# Patient Record
Sex: MALE | Race: WHITE | URBAN - METROPOLITAN AREA
[De-identification: names, ages, dates, MRNs, and addresses within clinical notes are randomized per-mention and may not be internally consistent; named-entity substitution may affect disease eponyms.]

---

## 2020-09-25 ENCOUNTER — OFFICE VISIT (OUTPATIENT)
Dept: URBAN - METROPOLITAN AREA CLINIC 17 | Facility: CLINIC | Age: 85
End: 2020-09-25
Payer: MEDICARE

## 2020-09-25 DIAGNOSIS — H26.491 OTHER SECONDARY CATARACT, RIGHT EYE: Primary | ICD-10-CM

## 2020-09-25 PROCEDURE — 99203 OFFICE O/P NEW LOW 30 MIN: CPT | Performed by: OPHTHALMOLOGY

## 2020-09-25 ASSESSMENT — VISUAL ACUITY
OD: 20/50
OS: 20/40

## 2020-09-25 ASSESSMENT — INTRAOCULAR PRESSURE
OS: 15
OD: 17

## 2020-09-25 ASSESSMENT — KERATOMETRY
OD: 44.38
OS: 44.25

## 2020-10-12 ENCOUNTER — SURGERY (OUTPATIENT)
Dept: URBAN - METROPOLITAN AREA SURGERY 7 | Facility: SURGERY | Age: 85
End: 2020-10-12
Payer: MEDICARE

## 2020-10-12 PROCEDURE — 66821 AFTER CATARACT LASER SURGERY: CPT | Performed by: OPHTHALMOLOGY

## 2020-10-19 ENCOUNTER — POST-OPERATIVE VISIT (OUTPATIENT)
Dept: URBAN - METROPOLITAN AREA CLINIC 17 | Facility: CLINIC | Age: 85
End: 2020-10-19
Payer: MEDICARE

## 2020-10-19 DIAGNOSIS — Z48.810 ENCOUNTER FOR SURGICAL AFTERCARE FOLLOWING SURGERY ON A SENSE ORGAN: Primary | ICD-10-CM

## 2020-10-19 PROCEDURE — 99024 POSTOP FOLLOW-UP VISIT: CPT | Performed by: OPTOMETRIST

## 2020-10-19 ASSESSMENT — INTRAOCULAR PRESSURE
OD: 10
OS: 11

## 2020-10-19 NOTE — IMPRESSION/PLAN
Impression: S/P YAG PC OD - 7 Days. Encounter for surgical aftercare following surgery on a sense organ  [id number].  Plan: RTC to see Dr. Shukri Rabago